# Patient Record
Sex: FEMALE | Race: WHITE | URBAN - METROPOLITAN AREA
[De-identification: names, ages, dates, MRNs, and addresses within clinical notes are randomized per-mention and may not be internally consistent; named-entity substitution may affect disease eponyms.]

---

## 2020-06-11 ENCOUNTER — LAB OUTSIDE AN ENCOUNTER (OUTPATIENT)
Dept: URBAN - METROPOLITAN AREA TELEHEALTH 2 | Facility: TELEHEALTH | Age: 80
End: 2020-06-11

## 2020-06-11 ENCOUNTER — OFFICE VISIT (OUTPATIENT)
Dept: URBAN - METROPOLITAN AREA TELEHEALTH 2 | Facility: TELEHEALTH | Age: 80
End: 2020-06-11
Payer: MEDICARE

## 2020-06-11 DIAGNOSIS — K74.3 PBC (PRIMARY BILIARY CIRRHOSIS): ICD-10-CM

## 2020-06-11 DIAGNOSIS — K75.4 AUTOIMMUNE HEPATITIS: ICD-10-CM

## 2020-06-11 DIAGNOSIS — Z86.010 PERSONAL HISTORY OF COLONIC POLYP: ICD-10-CM

## 2020-06-11 DIAGNOSIS — K74.60 CIRRHOSIS: ICD-10-CM

## 2020-06-11 DIAGNOSIS — Z85.038 PERSONAL HISTORY OF COLON CANCER: ICD-10-CM

## 2020-06-11 PROCEDURE — 99213 OFFICE O/P EST LOW 20 MIN: CPT | Performed by: INTERNAL MEDICINE

## 2020-06-11 RX ORDER — SODIUM, POTASSIUM,MAG SULFATES 17.5-3.13G
17.5-13.3-1.6 GM/177ML SOLUTION, RECONSTITUTED, ORAL ORAL
Qty: 177 MILLILITER | Refills: 0 | OUTPATIENT
Start: 2020-06-11 | End: 2020-06-12

## 2020-06-11 RX ORDER — PILOCARPINE HYDROCHLORIDE 5 MG/1
TAKE 0.5 TABLET BY ORAL ROUTE 3 TIMES A DAY TABLET, FILM COATED ORAL
Qty: 0 | Refills: 0 | Status: ACTIVE | COMMUNITY
Start: 1900-01-01

## 2020-06-11 NOTE — HPI-OTHER HISTORIES
79 y.o. W[place] In [place], bored to death Doing pretty good Nothing changed since last visit No bowel issues Feeling good

## 2020-06-24 ENCOUNTER — WEB ENCOUNTER (OUTPATIENT)
Dept: URBAN - METROPOLITAN AREA CLINIC 96 | Facility: CLINIC | Age: 80
End: 2020-06-24

## 2020-07-02 ENCOUNTER — LAB OUTSIDE AN ENCOUNTER (OUTPATIENT)
Dept: URBAN - METROPOLITAN AREA CLINIC 96 | Facility: CLINIC | Age: 80
End: 2020-07-02

## 2020-07-02 LAB
CREATININE POC: 0.9
PERFORMING LAB: (no result)

## 2020-07-08 ENCOUNTER — LAB OUTSIDE AN ENCOUNTER (OUTPATIENT)
Dept: URBAN - METROPOLITAN AREA CLINIC 96 | Facility: CLINIC | Age: 80
End: 2020-07-08

## 2020-07-08 ENCOUNTER — OFFICE VISIT (OUTPATIENT)
Dept: URBAN - METROPOLITAN AREA SURGERY CENTER 18 | Facility: SURGERY CENTER | Age: 80
End: 2020-07-08
Payer: MEDICARE

## 2020-07-08 DIAGNOSIS — D12.3 ADENOMA OF TRANSVERSE COLON: ICD-10-CM

## 2020-07-08 DIAGNOSIS — Z85.038 PERSONAL HISTORY OF COLON CANCER: ICD-10-CM

## 2020-07-08 DIAGNOSIS — D12.0 BENIGN NEOPLASM OF CECUM: ICD-10-CM

## 2020-07-08 DIAGNOSIS — K31.89 ACQUIRED DEFORMITY OF PYLORUS: ICD-10-CM

## 2020-07-08 DIAGNOSIS — K55.20 ANGIODYSPLASIA: ICD-10-CM

## 2020-07-08 DIAGNOSIS — K22.10 BARRETT'S ESOPHAGEAL ULCERATION: ICD-10-CM

## 2020-07-08 DIAGNOSIS — K29.60 ADENOPAPILLOMATOSIS GASTRICA: ICD-10-CM

## 2020-07-08 PROCEDURE — G9936 PMH PLYP/NEO CO/RECT/JUN/ANS: HCPCS | Performed by: INTERNAL MEDICINE

## 2020-07-08 PROCEDURE — G8907 PT DOC NO EVENTS ON DISCHARG: HCPCS | Performed by: INTERNAL MEDICINE

## 2020-07-08 PROCEDURE — 45380 COLONOSCOPY AND BIOPSY: CPT | Performed by: INTERNAL MEDICINE

## 2020-07-08 PROCEDURE — 43239 EGD BIOPSY SINGLE/MULTIPLE: CPT | Performed by: INTERNAL MEDICINE

## 2020-07-08 RX ORDER — PILOCARPINE HYDROCHLORIDE 5 MG/1
TAKE 0.5 TABLET BY ORAL ROUTE 3 TIMES A DAY TABLET, FILM COATED ORAL
Qty: 0 | Refills: 0 | Status: ACTIVE | COMMUNITY
Start: 1900-01-01

## 2020-07-09 LAB
A/G RATIO: 2
AFP, SERUM, TUMOR MARKER: 7.9
ALBUMIN: 5.1
ALKALINE PHOSPHATASE: 148
ALT (SGPT): 33
AST (SGOT): 52
BASO (ABSOLUTE): 0
BASOS: 1
BILIRUBIN, TOTAL: 0.4
BUN/CREATININE RATIO: 21
BUN: 17
CALCIUM: 10.3
CARBON DIOXIDE, TOTAL: 20
CHLORIDE: 102
CHOLESTEROL, TOTAL: 237
COMMENT:: (no result)
CREATININE: 0.8
EGFR IF AFRICN AM: 82
EGFR IF NONAFRICN AM: 71
EOS (ABSOLUTE): 0.1
EOS: 1
GLOBULIN, TOTAL: 2.5
GLUCOSE: 111
HDL CHOLESTEROL: 86
HEMATOCRIT: 41.2
HEMATOLOGY COMMENTS:: (no result)
HEMOGLOBIN: 13.1
IMMATURE CELLS: (no result)
IMMATURE GRANS (ABS): 0
IMMATURE GRANULOCYTES: 0
LDL CHOLESTEROL CALC: 128
LYMPHS (ABSOLUTE): 2.4
LYMPHS: 30
MCH: 28.4
MCHC: 31.8
MCV: 89
MONOCYTES(ABSOLUTE): 0.6
MONOCYTES: 7
NEUTROPHILS (ABSOLUTE): 4.9
NEUTROPHILS: 61
NRBC: (no result)
PLATELETS: 222
POTASSIUM: 5.3
PROTEIN, TOTAL: 7.6
RBC: 4.62
RDW: 13.2
SODIUM: 140
T4,FREE(DIRECT): 1.31
TRIGLYCERIDES: 117
TSH: 2.94
VITAMIN D, 25-HYDROXY: 34.5
VLDL CHOLESTEROL CAL: 23
WBC: 8

## 2020-07-16 ENCOUNTER — TELEPHONE ENCOUNTER (OUTPATIENT)
Dept: URBAN - METROPOLITAN AREA CLINIC 92 | Facility: CLINIC | Age: 80
End: 2020-07-16

## 2020-10-21 ENCOUNTER — TELEPHONE ENCOUNTER (OUTPATIENT)
Dept: URBAN - METROPOLITAN AREA CLINIC 96 | Facility: CLINIC | Age: 80
End: 2020-10-21

## 2020-10-22 ENCOUNTER — TELEPHONE ENCOUNTER (OUTPATIENT)
Dept: URBAN - METROPOLITAN AREA CLINIC 96 | Facility: CLINIC | Age: 80
End: 2020-10-22

## 2020-10-22 PROBLEM — 255417007 CIRRHOTIC: Status: ACTIVE | Noted: 2020-10-22

## 2020-11-13 ENCOUNTER — TELEPHONE ENCOUNTER (OUTPATIENT)
Dept: URBAN - METROPOLITAN AREA CLINIC 96 | Facility: CLINIC | Age: 80
End: 2020-11-13

## 2020-12-15 ENCOUNTER — LAB OUTSIDE AN ENCOUNTER (OUTPATIENT)
Dept: URBAN - METROPOLITAN AREA CLINIC 96 | Facility: CLINIC | Age: 80
End: 2020-12-15

## 2021-03-01 ENCOUNTER — TELEPHONE ENCOUNTER (OUTPATIENT)
Dept: URBAN - METROPOLITAN AREA CLINIC 96 | Facility: CLINIC | Age: 81
End: 2021-03-01

## 2021-03-12 ENCOUNTER — OFFICE VISIT (OUTPATIENT)
Dept: URBAN - METROPOLITAN AREA CLINIC 96 | Facility: CLINIC | Age: 81
End: 2021-03-12
Payer: MEDICARE

## 2021-03-12 ENCOUNTER — WEB ENCOUNTER (OUTPATIENT)
Dept: URBAN - METROPOLITAN AREA CLINIC 96 | Facility: CLINIC | Age: 81
End: 2021-03-12

## 2021-03-12 VITALS
SYSTOLIC BLOOD PRESSURE: 144 MMHG | HEIGHT: 64 IN | HEART RATE: 94 BPM | TEMPERATURE: 96.2 F | DIASTOLIC BLOOD PRESSURE: 78 MMHG | WEIGHT: 145 LBS | BODY MASS INDEX: 24.75 KG/M2

## 2021-03-12 DIAGNOSIS — K74.3 PRIMARY BILIARY CIRRHOSIS: ICD-10-CM

## 2021-03-12 PROCEDURE — 99214 OFFICE O/P EST MOD 30 MIN: CPT | Performed by: INTERNAL MEDICINE

## 2021-03-12 RX ORDER — URSODIOL 300 MG/1
1 CAPSULE CAPSULE ORAL TID
Qty: 270 CAPSULE | Refills: 3 | OUTPATIENT

## 2021-03-12 RX ORDER — PILOCARPINE HYDROCHLORIDE 5 MG/1
TAKE 0.5 TABLET BY ORAL ROUTE 3 TIMES A DAY TABLET, FILM COATED ORAL
Qty: 0 | Refills: 0 | Status: DISCONTINUED | COMMUNITY
Start: 1900-01-01

## 2021-03-12 NOTE — HPI-OTHER HISTORIES
80 y.o. WF Just recently had hip replacement - went well - happened after a fall Doing well Bowels moving well w/ yogurt  from fall in L thigh Using cane to ambulate

## 2021-03-18 ENCOUNTER — DASHBOARD ENCOUNTERS (OUTPATIENT)
Age: 81
End: 2021-03-18

## 2021-03-18 ENCOUNTER — LAB OUTSIDE AN ENCOUNTER (OUTPATIENT)
Dept: URBAN - METROPOLITAN AREA CLINIC 96 | Facility: CLINIC | Age: 81
End: 2021-03-18

## 2021-03-18 LAB
CREATININE POC: 1.1
PERFORMING LAB: (no result)

## 2022-04-28 ENCOUNTER — TELEPHONE ENCOUNTER (OUTPATIENT)
Dept: URBAN - METROPOLITAN AREA CLINIC 23 | Facility: CLINIC | Age: 82
End: 2022-04-28